# Patient Record
Sex: FEMALE | Race: WHITE | NOT HISPANIC OR LATINO | Employment: OTHER | URBAN - METROPOLITAN AREA
[De-identification: names, ages, dates, MRNs, and addresses within clinical notes are randomized per-mention and may not be internally consistent; named-entity substitution may affect disease eponyms.]

---

## 2018-05-09 ENCOUNTER — ESTABLISHED COMPREHENSIVE EXAM (OUTPATIENT)
Dept: URBAN - METROPOLITAN AREA CLINIC 35 | Facility: CLINIC | Age: 78
End: 2018-05-09

## 2018-05-09 DIAGNOSIS — H04.123: ICD-10-CM

## 2018-05-09 DIAGNOSIS — H25.13: ICD-10-CM

## 2018-05-09 DIAGNOSIS — H18.451: ICD-10-CM

## 2018-05-09 PROCEDURE — G8427 DOCREV CUR MEDS BY ELIG CLIN: HCPCS

## 2018-05-09 PROCEDURE — 1036F TOBACCO NON-USER: CPT

## 2018-05-09 PROCEDURE — 92014 COMPRE OPH EXAM EST PT 1/>: CPT

## 2018-05-09 PROCEDURE — 92015 DETERMINE REFRACTIVE STATE: CPT

## 2018-05-09 ASSESSMENT — KERATOMETRY
OD_K2POWER_DIOPTERS: 45.00
OS_K1POWER_DIOPTERS: 44.25
OS_K2POWER_DIOPTERS: 45.25
OD_K1POWER_DIOPTERS: 44.50

## 2018-05-09 ASSESSMENT — VISUAL ACUITY
OD_CC: 20/20-2
OS_CC: 20/20-1
OD_CC: J1
OS_CC: J1+

## 2018-05-09 ASSESSMENT — TONOMETRY
OS_IOP_MMHG: 14
OD_IOP_MMHG: 14

## 2019-07-09 ENCOUNTER — ESTABLISHED COMPREHENSIVE EXAM (OUTPATIENT)
Dept: URBAN - METROPOLITAN AREA CLINIC 35 | Facility: CLINIC | Age: 79
End: 2019-07-09

## 2019-07-09 DIAGNOSIS — H25.13: ICD-10-CM

## 2019-07-09 DIAGNOSIS — H04.123: ICD-10-CM

## 2019-07-09 DIAGNOSIS — H52.13: ICD-10-CM

## 2019-07-09 DIAGNOSIS — H18.451: ICD-10-CM

## 2019-07-09 PROCEDURE — 92015 DETERMINE REFRACTIVE STATE: CPT

## 2019-07-09 PROCEDURE — 92014 COMPRE OPH EXAM EST PT 1/>: CPT

## 2019-07-09 ASSESSMENT — VISUAL ACUITY
OD_SC: J12
OS_SC: J3
OS_BAT: 20/200 WITH MR
OD_CC: 20/30-1
OD_SC: 20/30-2
OD_CC: J2-
OS_CC: 20/30-1
OS_CC: J3
OD_BAT: 20/100 WITH MR
OS_SC: 20/60-1

## 2019-07-09 ASSESSMENT — KERATOMETRY
OS_K1POWER_DIOPTERS: 44.25
OD_K1POWER_DIOPTERS: 44.50
OS_K2POWER_DIOPTERS: 45.25
OD_K2POWER_DIOPTERS: 45.00

## 2019-07-09 ASSESSMENT — TONOMETRY
OS_IOP_MMHG: 15
OD_IOP_MMHG: 15

## 2019-07-29 ENCOUNTER — CATARACT CONSULT (OUTPATIENT)
Dept: URBAN - METROPOLITAN AREA CLINIC 39 | Facility: CLINIC | Age: 79
End: 2019-07-29

## 2019-07-29 DIAGNOSIS — H18.451: ICD-10-CM

## 2019-07-29 DIAGNOSIS — H25.811: ICD-10-CM

## 2019-07-29 DIAGNOSIS — H43.813: ICD-10-CM

## 2019-07-29 DIAGNOSIS — H04.123: ICD-10-CM

## 2019-07-29 DIAGNOSIS — H18.51: ICD-10-CM

## 2019-07-29 DIAGNOSIS — H25.812: ICD-10-CM

## 2019-07-29 PROCEDURE — 92025-2 CORNEAL TOPOGRAPHY, PT

## 2019-07-29 PROCEDURE — V2799PMN IMPRIMIS PRED-MOXI-NEPAF 5ML

## 2019-07-29 PROCEDURE — 92136TC INTERFEROMETRY - TECHNICAL COMPONENT

## 2019-07-29 PROCEDURE — 92286 ANT SGM IMG I&R SPECLR MIC: CPT

## 2019-07-29 PROCEDURE — 99214 OFFICE O/P EST MOD 30 MIN: CPT

## 2019-07-29 ASSESSMENT — VISUAL ACUITY
OS_CC: 20/50
OS_AM: 20/20
OD_SC: J4
OD_CC: 20/30
OS_SC: 20/70
OD_SC: 20/40
OS_SC: J3
OD_BAT: 20/100
OD_CC: J1
OS_BAT: 20/200
OD_RAM: 20/20-1
OS_CC: J2

## 2019-07-29 ASSESSMENT — TONOMETRY
OD_IOP_MMHG: 15
OS_IOP_MMHG: 14

## 2019-08-12 ENCOUNTER — SURGERY/PROCEDURE (OUTPATIENT)
Dept: URBAN - METROPOLITAN AREA CLINIC 39 | Facility: CLINIC | Age: 79
End: 2019-08-12

## 2019-08-12 ENCOUNTER — PRE-OP/H&P (OUTPATIENT)
Dept: URBAN - METROPOLITAN AREA CLINIC 39 | Facility: CLINIC | Age: 79
End: 2019-08-12

## 2019-08-12 DIAGNOSIS — H18.51: ICD-10-CM

## 2019-08-12 DIAGNOSIS — H25.812: ICD-10-CM

## 2019-08-12 DIAGNOSIS — H04.123: ICD-10-CM

## 2019-08-12 DIAGNOSIS — H25.811: ICD-10-CM

## 2019-08-12 DIAGNOSIS — H52.203: ICD-10-CM

## 2019-08-12 DIAGNOSIS — H18.451: ICD-10-CM

## 2019-08-12 DIAGNOSIS — H52.13: ICD-10-CM

## 2019-08-12 DIAGNOSIS — H43.813: ICD-10-CM

## 2019-08-12 PROCEDURE — 65772LRI LRI DURING CAT SX

## 2019-08-12 PROCEDURE — 66999LNSR LENSAR LASER FOR CAT SX

## 2019-08-12 PROCEDURE — 66984AV REMOVE CATARACT, INSERT ADVANCED LENS

## 2019-08-12 PROCEDURE — 99211T TECH SERVICE

## 2019-08-13 ENCOUNTER — POST OP/EVAL OF SECOND EYE (OUTPATIENT)
Dept: URBAN - METROPOLITAN AREA CLINIC 35 | Facility: CLINIC | Age: 79
End: 2019-08-13

## 2019-08-13 DIAGNOSIS — H18.451: ICD-10-CM

## 2019-08-13 DIAGNOSIS — H04.123: ICD-10-CM

## 2019-08-13 DIAGNOSIS — H25.811: ICD-10-CM

## 2019-08-13 DIAGNOSIS — H43.813: ICD-10-CM

## 2019-08-13 DIAGNOSIS — Z96.1: ICD-10-CM

## 2019-08-13 PROCEDURE — 99024 POSTOP FOLLOW-UP VISIT: CPT

## 2019-08-13 PROCEDURE — 92012 INTRM OPH EXAM EST PATIENT: CPT

## 2019-08-13 ASSESSMENT — VISUAL ACUITY
OS_SC: J4
OS_SC: 20/30
OD_SC: J4
OD_SC: 20/40

## 2019-08-19 ENCOUNTER — PRE-OP/H&P (OUTPATIENT)
Dept: URBAN - METROPOLITAN AREA CLINIC 39 | Facility: CLINIC | Age: 79
End: 2019-08-19

## 2019-08-19 ENCOUNTER — SURGERY/PROCEDURE (OUTPATIENT)
Dept: URBAN - METROPOLITAN AREA CLINIC 39 | Facility: CLINIC | Age: 79
End: 2019-08-19

## 2019-08-19 DIAGNOSIS — H04.123: ICD-10-CM

## 2019-08-19 DIAGNOSIS — H18.451: ICD-10-CM

## 2019-08-19 DIAGNOSIS — H52.203: ICD-10-CM

## 2019-08-19 DIAGNOSIS — H25.812: ICD-10-CM

## 2019-08-19 DIAGNOSIS — H25.811: ICD-10-CM

## 2019-08-19 DIAGNOSIS — H43.813: ICD-10-CM

## 2019-08-19 DIAGNOSIS — H52.13: ICD-10-CM

## 2019-08-19 DIAGNOSIS — H18.51: ICD-10-CM

## 2019-08-19 DIAGNOSIS — Z96.1: ICD-10-CM

## 2019-08-19 PROCEDURE — 66984AV REMOVE CATARACT, INSERT ADVANCED LENS

## 2019-08-19 PROCEDURE — 65772LRI LRI DURING CAT SX

## 2019-08-19 PROCEDURE — 99211T TECH SERVICE

## 2019-08-19 PROCEDURE — 66999LNSR LENSAR LASER FOR CAT SX

## 2019-08-19 ASSESSMENT — VISUAL ACUITY
OD_SC: 20/40
OS_SC: 20/20
OS_SC: J6
OD_SC: J4

## 2019-08-19 ASSESSMENT — TONOMETRY
OS_IOP_MMHG: 15
OD_IOP_MMHG: 15

## 2019-08-20 ENCOUNTER — CATARACT POST-OP 1-DAY (OUTPATIENT)
Dept: URBAN - METROPOLITAN AREA CLINIC 35 | Facility: CLINIC | Age: 79
End: 2019-08-20

## 2019-08-20 DIAGNOSIS — Z96.1: ICD-10-CM

## 2019-08-20 PROCEDURE — 99024 POSTOP FOLLOW-UP VISIT: CPT

## 2019-08-20 ASSESSMENT — VISUAL ACUITY
OS_SC: J3
OD_SC: 20/25-2
OD_SC: J2
OS_SC: 20/25

## 2019-08-20 ASSESSMENT — TONOMETRY
OD_IOP_MMHG: 16
OS_IOP_MMHG: 16

## 2019-09-10 ENCOUNTER — POST-OP CATARACT (OUTPATIENT)
Dept: URBAN - METROPOLITAN AREA CLINIC 35 | Facility: CLINIC | Age: 79
End: 2019-09-10

## 2019-09-10 DIAGNOSIS — Z96.1: ICD-10-CM

## 2019-09-10 PROCEDURE — 99024 POSTOP FOLLOW-UP VISIT: CPT

## 2019-09-10 ASSESSMENT — VISUAL ACUITY
OS_SC: 20/20
OD_SC: J1+
OU_SC: J1+
OS_SC: J10
OU_SC: 20/20
OD_SC: 20/20-2

## 2019-11-01 NOTE — PATIENT DISCUSSION
Lid flipped, NO FB or contact lens in the eye. Educated to D/C CL wear while using the drops prescribed.

## 2020-05-14 ENCOUNTER — ESTABLISHED COMPREHENSIVE EXAM (OUTPATIENT)
Dept: URBAN - METROPOLITAN AREA CLINIC 35 | Facility: CLINIC | Age: 80
End: 2020-05-14

## 2020-05-14 DIAGNOSIS — H04.123: ICD-10-CM

## 2020-05-14 DIAGNOSIS — H18.51: ICD-10-CM

## 2020-05-14 DIAGNOSIS — H52.4: ICD-10-CM

## 2020-05-14 DIAGNOSIS — H18.451: ICD-10-CM

## 2020-05-14 DIAGNOSIS — H43.813: ICD-10-CM

## 2020-05-14 DIAGNOSIS — H26.493: ICD-10-CM

## 2020-05-14 PROCEDURE — 92015 DETERMINE REFRACTIVE STATE: CPT

## 2020-05-14 PROCEDURE — 92014 COMPRE OPH EXAM EST PT 1/>: CPT

## 2020-05-14 ASSESSMENT — VISUAL ACUITY
OD_SC: J1
OS_SC: J6
OD_SC: 20/25-2
OS_SC: 20/25-1

## 2020-05-14 ASSESSMENT — TONOMETRY
OS_IOP_MMHG: 15
OD_IOP_MMHG: 15

## 2020-12-09 ENCOUNTER — ESTABLISHED COMPREHENSIVE EXAM (OUTPATIENT)
Dept: URBAN - METROPOLITAN AREA CLINIC 35 | Facility: CLINIC | Age: 80
End: 2020-12-09

## 2020-12-09 DIAGNOSIS — H25.811: ICD-10-CM

## 2020-12-09 DIAGNOSIS — H04.123: ICD-10-CM

## 2020-12-09 DIAGNOSIS — H26.493: ICD-10-CM

## 2020-12-09 DIAGNOSIS — H43.813: ICD-10-CM

## 2020-12-09 DIAGNOSIS — H18.451: ICD-10-CM

## 2020-12-09 DIAGNOSIS — H18.519: ICD-10-CM

## 2020-12-09 PROCEDURE — 92014 COMPRE OPH EXAM EST PT 1/>: CPT

## 2020-12-09 ASSESSMENT — KERATOMETRY
OD_K2POWER_DIOPTERS: 43.75
OD_K1POWER_DIOPTERS: 44.50
OS_K2POWER_DIOPTERS: 5.25
OS_K1POWER_DIOPTERS: 44.75

## 2020-12-09 ASSESSMENT — VISUAL ACUITY
OU_SC: 20/25-2
OS_SC: 20/40
OS_SC: J5
OD_SC: J1
OD_SC: 20/30-1
OU_SC: J1

## 2020-12-09 ASSESSMENT — TONOMETRY
OD_IOP_MMHG: 14
OS_IOP_MMHG: 14

## 2021-04-07 ENCOUNTER — ESTABLISHED COMPREHENSIVE EXAM (OUTPATIENT)
Dept: URBAN - METROPOLITAN AREA CLINIC 35 | Facility: CLINIC | Age: 81
End: 2021-04-07

## 2021-04-07 DIAGNOSIS — H43.813: ICD-10-CM

## 2021-04-07 DIAGNOSIS — H26.493: ICD-10-CM

## 2021-04-07 DIAGNOSIS — H04.123: ICD-10-CM

## 2021-04-07 DIAGNOSIS — H18.519: ICD-10-CM

## 2021-04-07 DIAGNOSIS — H18.451: ICD-10-CM

## 2021-04-07 DIAGNOSIS — H25.811: ICD-10-CM

## 2021-04-07 PROCEDURE — 92014 COMPRE OPH EXAM EST PT 1/>: CPT

## 2021-04-07 ASSESSMENT — TONOMETRY
OD_IOP_MMHG: 14
OS_IOP_MMHG: 14

## 2021-04-07 ASSESSMENT — VISUAL ACUITY
OS_SC: 20/70
OS_SC: J5
OD_SC: J2
OD_SC: 20/30
OS_PH: 20/30

## 2021-04-07 ASSESSMENT — KERATOMETRY
OD_K1POWER_DIOPTERS: 44.50
OS_K1POWER_DIOPTERS: 45.50
OS_K2POWER_DIOPTERS: 46.00
OD_K2POWER_DIOPTERS: 44.75

## 2021-06-14 ENCOUNTER — SURGERY/PROCEDURE (OUTPATIENT)
Dept: URBAN - METROPOLITAN AREA SURGERY 14 | Facility: SURGERY | Age: 81
End: 2021-06-14

## 2021-06-14 ENCOUNTER — YAG EVALUATION (OUTPATIENT)
Dept: URBAN - METROPOLITAN AREA CLINIC 39 | Facility: CLINIC | Age: 81
End: 2021-06-14

## 2021-06-14 DIAGNOSIS — H25.811: ICD-10-CM

## 2021-06-14 DIAGNOSIS — H26.493: ICD-10-CM

## 2021-06-14 PROCEDURE — 6682150 YAG CAPSULOTOMY

## 2021-06-14 PROCEDURE — 92012 INTRM OPH EXAM EST PATIENT: CPT

## 2021-06-14 ASSESSMENT — VISUAL ACUITY
OD_BAT: 20/40
OD_SC: J2
OD_SC: 20/30-2
OS_SC: 20/40-2
OS_SC: J8
OS_BAT: 20/60

## 2021-06-14 ASSESSMENT — TONOMETRY
OS_IOP_MMHG: 14
OD_IOP_MMHG: 15

## 2021-06-21 ENCOUNTER — YAG POST-OP (OUTPATIENT)
Dept: URBAN - METROPOLITAN AREA CLINIC 35 | Facility: CLINIC | Age: 81
End: 2021-06-21

## 2021-06-21 DIAGNOSIS — Z98.890: ICD-10-CM

## 2021-06-21 DIAGNOSIS — H04.123: ICD-10-CM

## 2021-06-21 PROCEDURE — 99024 POSTOP FOLLOW-UP VISIT: CPT

## 2021-06-21 ASSESSMENT — VISUAL ACUITY
OD_SC: 20/25
OS_SC: J3
OS_SC: 20/40
OU_SC: 20/25
OU_SC: J1
OD_SC: J2

## 2021-06-21 ASSESSMENT — TONOMETRY
OS_IOP_MMHG: 15
OD_IOP_MMHG: 15

## 2023-04-01 ENCOUNTER — COMPREHENSIVE EXAM (OUTPATIENT)
Dept: URBAN - METROPOLITAN AREA CLINIC 35 | Facility: CLINIC | Age: 83
End: 2023-04-01

## 2023-04-01 DIAGNOSIS — H43.813: ICD-10-CM

## 2023-04-01 DIAGNOSIS — H04.123: ICD-10-CM

## 2023-04-01 DIAGNOSIS — H18.513: ICD-10-CM

## 2023-04-01 DIAGNOSIS — H18.451: ICD-10-CM

## 2023-04-01 PROCEDURE — 92134 CPTRZ OPH DX IMG PST SGM RTA: CPT

## 2023-04-01 PROCEDURE — 92014 COMPRE OPH EXAM EST PT 1/>: CPT

## 2023-04-01 ASSESSMENT — VISUAL ACUITY
OS_SC: J1-2
OS_SC: 20/30
OU_SC: J1+
OD_SC: 20/25+2
OD_SC: J1-2
OU_SC: 20/20-2

## 2023-04-01 ASSESSMENT — TONOMETRY
OD_IOP_MMHG: 16
OS_IOP_MMHG: 14

## 2025-03-24 ENCOUNTER — COMPREHENSIVE EXAM (OUTPATIENT)
Age: 85
End: 2025-03-24

## 2025-03-24 DIAGNOSIS — H18.451: ICD-10-CM

## 2025-03-24 DIAGNOSIS — H43.813: ICD-10-CM

## 2025-03-24 DIAGNOSIS — H18.513: ICD-10-CM

## 2025-03-24 DIAGNOSIS — H04.123: ICD-10-CM

## 2025-03-24 PROCEDURE — 92014 COMPRE OPH EXAM EST PT 1/>: CPT

## 2025-03-24 PROCEDURE — 92134 CPTRZ OPH DX IMG PST SGM RTA: CPT
